# Patient Record
Sex: MALE | Race: ASIAN
[De-identification: names, ages, dates, MRNs, and addresses within clinical notes are randomized per-mention and may not be internally consistent; named-entity substitution may affect disease eponyms.]

---

## 2018-12-25 ENCOUNTER — HOSPITAL ENCOUNTER (INPATIENT)
Dept: HOSPITAL 26 - MED | Age: 83
LOS: 1 days | Discharge: LEFT BEFORE DISCHARGE | DRG: 871 | End: 2018-12-26
Attending: GENERAL PRACTICE | Admitting: GENERAL PRACTICE
Payer: COMMERCIAL

## 2018-12-25 VITALS — SYSTOLIC BLOOD PRESSURE: 128 MMHG | DIASTOLIC BLOOD PRESSURE: 74 MMHG

## 2018-12-25 VITALS — SYSTOLIC BLOOD PRESSURE: 114 MMHG | DIASTOLIC BLOOD PRESSURE: 72 MMHG

## 2018-12-25 VITALS — SYSTOLIC BLOOD PRESSURE: 117 MMHG | DIASTOLIC BLOOD PRESSURE: 77 MMHG

## 2018-12-25 VITALS — HEIGHT: 66 IN | BODY MASS INDEX: 25.71 KG/M2 | WEIGHT: 160 LBS

## 2018-12-25 VITALS — SYSTOLIC BLOOD PRESSURE: 103 MMHG | DIASTOLIC BLOOD PRESSURE: 62 MMHG

## 2018-12-25 VITALS — SYSTOLIC BLOOD PRESSURE: 110 MMHG | DIASTOLIC BLOOD PRESSURE: 64 MMHG

## 2018-12-25 DIAGNOSIS — Z53.21: ICD-10-CM

## 2018-12-25 DIAGNOSIS — I21.3: ICD-10-CM

## 2018-12-25 DIAGNOSIS — Z79.82: ICD-10-CM

## 2018-12-25 DIAGNOSIS — I10: ICD-10-CM

## 2018-12-25 DIAGNOSIS — E78.5: ICD-10-CM

## 2018-12-25 DIAGNOSIS — N40.0: ICD-10-CM

## 2018-12-25 DIAGNOSIS — A41.9: Primary | ICD-10-CM

## 2018-12-25 DIAGNOSIS — Z79.899: ICD-10-CM

## 2018-12-25 DIAGNOSIS — K58.9: ICD-10-CM

## 2018-12-25 DIAGNOSIS — J45.909: ICD-10-CM

## 2018-12-25 DIAGNOSIS — J69.0: ICD-10-CM

## 2018-12-25 DIAGNOSIS — E66.3: ICD-10-CM

## 2018-12-25 DIAGNOSIS — Z87.891: ICD-10-CM

## 2018-12-25 DIAGNOSIS — E87.6: ICD-10-CM

## 2018-12-25 DIAGNOSIS — I25.10: ICD-10-CM

## 2018-12-25 DIAGNOSIS — J44.9: ICD-10-CM

## 2018-12-25 LAB
ALBUMIN FLD-MCNC: 3.5 G/DL (ref 3.4–5)
ANION GAP SERPL CALCULATED.3IONS-SCNC: 13.7 MMOL/L (ref 8–16)
APPEARANCE UR: CLEAR
AST SERPL-CCNC: 23 U/L (ref 15–37)
BASOPHILS # BLD AUTO: 0 K/UL (ref 0–0.22)
BASOPHILS NFR BLD AUTO: 0.2 % (ref 0–2)
BILIRUB SERPL-MCNC: 0.8 MG/DL (ref 0–1)
BILIRUB UR QL STRIP: NEGATIVE
BUN SERPL-MCNC: 13 MG/DL (ref 7–18)
CHLORIDE SERPL-SCNC: 104 MMOL/L (ref 98–107)
CO2 SERPL-SCNC: 25.3 MMOL/L (ref 21–32)
COLOR UR: YELLOW
CREAT SERPL-MCNC: 0.7 MG/DL (ref 0.7–1.3)
DEPRECATED D DIMER PPP-ACNC: 585 NG/ML (ref 0–400)
EOSINOPHIL # BLD AUTO: 0.1 K/UL (ref 0–0.4)
EOSINOPHIL NFR BLD AUTO: 1.6 % (ref 0–4)
ERYTHROCYTE [DISTWIDTH] IN BLOOD BY AUTOMATED COUNT: 13.1 % (ref 11.6–13.7)
GFR SERPL CREATININE-BSD FRML MDRD: (no result) ML/MIN (ref 90–?)
GLUCOSE SERPL-MCNC: 122 MG/DL (ref 74–106)
GLUCOSE UR STRIP-MCNC: NEGATIVE MG/DL
HCT VFR BLD AUTO: 41.7 % (ref 36–52)
HGB BLD-MCNC: 13.8 G/DL (ref 12–18)
HGB UR QL STRIP: (no result)
LEUKOCYTE ESTERASE UR QL STRIP: NEGATIVE
LIPASE SERPL-CCNC: 112 U/L (ref 73–393)
LYMPHOCYTES # BLD AUTO: 2.1 K/UL (ref 2–11.5)
LYMPHOCYTES NFR BLD AUTO: 28.9 % (ref 20.5–51.1)
MCH RBC QN AUTO: 32 PG (ref 27–31)
MCHC RBC AUTO-ENTMCNC: 33 G/DL (ref 33–37)
MCV RBC AUTO: 96.9 FL (ref 80–94)
MONOCYTES # BLD AUTO: 0.7 K/UL (ref 0.8–1)
MONOCYTES NFR BLD AUTO: 9.1 % (ref 1.7–9.3)
NEUTROPHILS # BLD AUTO: 4.4 K/UL (ref 1.8–7.7)
NEUTROPHILS NFR BLD AUTO: 60.2 % (ref 42.2–75.2)
NITRITE UR QL STRIP: NEGATIVE
PH UR STRIP: 7 [PH] (ref 5–9)
PHOSPHATE SERPL-MCNC: 2.7 MG/DL (ref 2.5–4.9)
PLATELET # BLD AUTO: 161 K/UL (ref 140–450)
POTASSIUM SERPL-SCNC: 3 MMOL/L (ref 3.5–5.1)
PROTHROMBIN TIME: 9.5 SECS (ref 10.8–13.4)
RBC # BLD AUTO: 4.31 MIL/UL (ref 4.2–6.1)
RBC #/AREA URNS HPF: (no result) /HPF (ref 0–5)
SODIUM SERPL-SCNC: 140 MMOL/L (ref 136–145)
WBC # BLD AUTO: 7.4 K/UL (ref 4.8–10.8)
WBC,URINE: (no result) /HPF (ref 0–5)

## 2018-12-25 RX ADMIN — Medication SCH MG: at 11:13

## 2018-12-25 RX ADMIN — CLINDAMYCIN PHOSPHATE SCH MLS/HR: 600 INJECTION, SOLUTION INTRAVENOUS at 13:52

## 2018-12-25 RX ADMIN — HEPARIN SODIUM SCH MLS/HR: 5000 INJECTION, SOLUTION INTRAVENOUS at 16:27

## 2018-12-25 RX ADMIN — Medication SCH MG: at 10:22

## 2018-12-25 RX ADMIN — SODIUM CHLORIDE SCH MLS/HR: 9 INJECTION, SOLUTION INTRAVENOUS at 11:14

## 2018-12-25 RX ADMIN — CLINDAMYCIN PHOSPHATE SCH MLS/HR: 600 INJECTION, SOLUTION INTRAVENOUS at 21:04

## 2018-12-25 NOTE — NUR
83Y/M BROUGHT IN BY EMS FROM HOME, PT C/O MIDSTERNAL CHEST WALL PAIN UNPROVOKED 
NON RADIATING AS PER DAUGHTER, Kyrgyz MAINLY SPEAKING, NO PEDAL EDEMA, NO 
JVD NOTED, SKIN DRY WARM INTACT, BED DOWN, BEDRAIL UP X 1, ER MD AWARE AND 
NOTIFIED OF PT STATUS.



HX: HYPERLIPIDEMIA, HTN, COPD, BPH, IBS



RX: EXFORGE, FLOMAX, OYSCO, SINGULAIR, APRODINE, ASA, LINZESS, METOPROLOL, 
SIMVASTATIN

## 2018-12-25 NOTE — NUR
RECEIVED PT FROM ED VIA [facility]. PT IS AMBULATORY WITHOUT ASSIST, GAIT EVEN AND STEADY. AOX4. 
NO C/O PAIN OR DISCOMFORT AT THIS TIME. DENIES CHEST PAIN, N/V, LIGHTHEADEDNESS. NO 
DIAPHORESIS. VITALS STABLE. RESPIRATIONS EVEN AND UNLABORED ON RA. SKIN INTACT. PLACED ON 
TELE MONITOR. PT IS Maltese SPEAKING BUT PREFERS FAMILY MEMBERS AT BEDSIDE TO TRANSLATE. 
DISCUSSED POC WITH PATIENT AND FAMILY MEMBERS. ALL SAFETY PRECAUTIONS IN PLACE, WILL 
CONTINUE TO MONITOR. 

-------------------------------------------------------------------------------

Addendum: 12/25/18 at 1546 by Ayde Allen RN

-------------------------------------------------------------------------------

NITROBID PATCH ON UPPER CHEST LEFT STERNAL BORDER. 

-------------------------------------------------------------------------------

Addendum: 12/25/18 at 1910 by Ayde Allen RN

-------------------------------------------------------------------------------

NITRO-BID TOPICAL.

## 2018-12-25 NOTE — NUR
PER PHARMACIST, CLINDAMYCIN IS ABLE TO BE RUN IN SAME LINE WITH K RIDER. ROCEPHIN IS ABLE TO 
RUN IN SAME LINE WITH K RIDER AS WELL. PHARMACIST TO CHANGE ROCEPHIN SCHEDULE TO LATER TIME.

## 2018-12-25 NOTE — NUR
DR. GERBER HAS EXPLAINED TO PATIENT HOW TO OBTAIN URINE CULTURE. SAMPLE CUP PROVIDED TO PT. 
PT VERBALIZED COMPLETE UNDERSTANDING.

## 2018-12-25 NOTE — NUR
ASKED PHARMACY TO VERIFY HEPARIN DRIP. 

CALLED CT AND INFORMED THEM THAT PT IS READY FOR HEAD CT. THEY WILL COME  PATIENT 
NOW.

## 2018-12-25 NOTE — NUR
NOTIFIED DR. GERBER OF CRITICAL TROPONIN VALUE. PT DENIES CHEST PAIN/DISCOMFORT, N/V, SOB, 
DIZZINESS. NO DIAPHORESIS. PER DR. GERBER, START HEPARIN DRIP AFTER STAT CT HEAD RESULTS.

## 2018-12-25 NOTE — NUR
PT REFUSED ASPIRIN. STATES THAT HE TOOK ASPIRIN THIS MORNING. PER ED RN, PT ALSO RECEIVED 
ASPIRIN IN AMBULANCE THIS MORNING. 

-------------------------------------------------------------------------------

Addendum: 12/25/18 at 1609 by Ayde Allen RN

-------------------------------------------------------------------------------

UNABLE TO RETURN TO [facility], MEDICATION ALREADY REMOVED FROM PACKAGE. WILL DISPOSE PER 
PROTOCOL.

## 2018-12-25 NOTE — NUR
ADMINISTERED CLINDAMYCIN, RUNNING THROUGH SAME IV SITE WITH CURRY RIDER. K RIDER RUNNING AT 
30ML/HOUR DUE TO PT C/O PAIN AT IV SITE.

## 2018-12-25 NOTE — NUR
RECEIVED REPORT FROM DAY SHIFT RN FOR CONTINUITY OF CARE. PT IS A/OX4, ON ROOM AIR. PT IS 
ABLE TO MAKE NEEDS KNOWN, ABLE TO FOLLOW COMMANDS. PT AMBULATES WITH STEADY GAIT, SKIN IS 
INTACT. PT HAS A 20G IV TO LEFT HAND, ASYMPTOMATIC AND INTACT. DISCUSSED PLAN OF CARE WITH 
PT, PT VERBALIZED UNDERSTANDING. VITAL SIGNS WITHIN NORMAL LIMITS. PT STABLE, DENIES PAIN, 
NO SIGNS OF DISTRESS NOTED AT THIS TIME. PT POSITIONED FOR COMFORT. BED IN LOWEST POSITION, 
BED ALARM ON. CALL LIGHT WITHIN REACH, WILL CONTINUE TO MONITOR.

## 2018-12-25 NOTE — NUR
ADMINISTERED SCHEDULED MEDICATIONS, PT TOLERATED WELL. DENIES PAIN, NO SIGNS OF DISTRESS 
NOTED AT THIS TIME. PT POSITIONED FOR COMFORT. BED IN LOWEST POSITION, BED ALARM ON. CALL 
LIGHT WITHIN REACH, WILL CONTINUE TO MONITOR.

## 2018-12-25 NOTE — NUR
PER LAB, URINE MUST BE COLLECTED AGAIN FOR URINE CULTURE. BLOOD CULTURES NOT COLLECTED YET. 
WILL NOT START IV ABX UNTIL BLOOD CULTURES OBTAINED.

## 2018-12-25 NOTE — NUR
Patient will be admitted to care of . Admited to tele floor.  Will go 
to room 107-b. Belongings list completed.  Report to ariana hammond.

## 2018-12-25 NOTE — NUR
RECEIVED REPORT FROM DAY SHIFT RN FOR CONTINUITY OF CARE. PT IS A/OX4, ON ROOM AIR. PT IS 
ABLE TO MAKE NEEDS KNOWN, ABLE TO FOLLOW COMMANDS. PT AMBULATES WITH STEADY GAIT, SKIN IS 
INTACT. PT HAS A 22G IV TO LEFT AC, ASYMPTOMATIC AND INTACT. DISCUSSED PLAN OF CARE WITH PT, 
PT VERBALIZED UNDERSTANDING. VITAL SIGNS WITHIN NORMAL LIMITS. PT STABLE, DENIES PAIN, NO 
SIGNS OF DISTRESS NOTED AT THIS TIME. PT POSITIONED FOR COMFORT. BED IN LOWEST POSITION, BED 
ALARM ON. CALL LIGHT WITHIN REACH, WILL CONTINUE TO MONITOR. 

-------------------------------------------------------------------------------

Addendum: 12/26/18 at 0341 by Alisha Cloud RN

-------------------------------------------------------------------------------

DISREGARD, WRONG IV SITE.

## 2018-12-26 VITALS — SYSTOLIC BLOOD PRESSURE: 130 MMHG | DIASTOLIC BLOOD PRESSURE: 76 MMHG

## 2018-12-26 VITALS — DIASTOLIC BLOOD PRESSURE: 79 MMHG | SYSTOLIC BLOOD PRESSURE: 137 MMHG

## 2018-12-26 VITALS — SYSTOLIC BLOOD PRESSURE: 130 MMHG | DIASTOLIC BLOOD PRESSURE: 74 MMHG

## 2018-12-26 VITALS — SYSTOLIC BLOOD PRESSURE: 141 MMHG | DIASTOLIC BLOOD PRESSURE: 76 MMHG

## 2018-12-26 LAB
ANION GAP SERPL CALCULATED.3IONS-SCNC: 10.5 MMOL/L (ref 8–16)
BASOPHILS # BLD AUTO: 0 K/UL (ref 0–0.22)
BASOPHILS NFR BLD AUTO: 0.2 % (ref 0–2)
BUN SERPL-MCNC: 9 MG/DL (ref 7–18)
CHLORIDE SERPL-SCNC: 107 MMOL/L (ref 98–107)
CO2 SERPL-SCNC: 25.5 MMOL/L (ref 21–32)
CREAT SERPL-MCNC: 0.7 MG/DL (ref 0.7–1.3)
EOSINOPHIL # BLD AUTO: 0.2 K/UL (ref 0–0.4)
EOSINOPHIL NFR BLD AUTO: 1.9 % (ref 0–4)
ERYTHROCYTE [DISTWIDTH] IN BLOOD BY AUTOMATED COUNT: 13 % (ref 11.6–13.7)
GFR SERPL CREATININE-BSD FRML MDRD: (no result) ML/MIN (ref 90–?)
GLUCOSE SERPL-MCNC: 101 MG/DL (ref 74–106)
HCT VFR BLD AUTO: 39.3 % (ref 36–52)
HGB BLD-MCNC: 13 G/DL (ref 12–18)
LYMPHOCYTES # BLD AUTO: 2.5 K/UL (ref 2–11.5)
LYMPHOCYTES NFR BLD AUTO: 30.1 % (ref 20.5–51.1)
MCH RBC QN AUTO: 32 PG (ref 27–31)
MCHC RBC AUTO-ENTMCNC: 33 G/DL (ref 33–37)
MCV RBC AUTO: 97.1 FL (ref 80–94)
MONOCYTES # BLD AUTO: 0.6 K/UL (ref 0.8–1)
MONOCYTES NFR BLD AUTO: 7 % (ref 1.7–9.3)
NEUTROPHILS # BLD AUTO: 5.1 K/UL (ref 1.8–7.7)
NEUTROPHILS NFR BLD AUTO: 60.8 % (ref 42.2–75.2)
PLATELET # BLD AUTO: 169 K/UL (ref 140–450)
POTASSIUM SERPL-SCNC: 3 MMOL/L (ref 3.5–5.1)
RBC # BLD AUTO: 4.05 MIL/UL (ref 4.2–6.1)
SODIUM SERPL-SCNC: 140 MMOL/L (ref 136–145)
WBC # BLD AUTO: 8.4 K/UL (ref 4.8–10.8)

## 2018-12-26 RX ADMIN — CLINDAMYCIN PHOSPHATE SCH MLS/HR: 600 INJECTION, SOLUTION INTRAVENOUS at 05:03

## 2018-12-26 RX ADMIN — HEPARIN SODIUM SCH MLS/HR: 5000 INJECTION, SOLUTION INTRAVENOUS at 10:33

## 2018-12-26 RX ADMIN — SODIUM CHLORIDE SCH MLS/HR: 9 INJECTION, SOLUTION INTRAVENOUS at 05:03

## 2018-12-26 RX ADMIN — HEPARIN SODIUM SCH MLS/HR: 5000 INJECTION, SOLUTION INTRAVENOUS at 09:59

## 2018-12-26 NOTE — NUR
NO SIGNS OF DISTRESS NOTED AT THIS TIME. PT POSITIONED FOR COMFORT. BED IN LOWEST POSITION, 
BED ALARM ON. CALL LIGHT WITHIN REACH, WILL CONTINUE TO MONITOR.

## 2018-12-26 NOTE — NUR
12/26/18 RD INITIAL ASSESSMENT COMPLETED



PLEASE REFER TO NUTRITION ASSESSMENT UNDER CARE ACTIVITY FOR ESTIMATED NUTRITIONAL NEEDS. 



1. CONTINUE CARDIAC DIET AS TOLERATED 

2. RD TO PROVIDE NUTRITION EDUCATION ON FOLLOW UP VISIT

3. RD TO FOLLOW-UP 3-5 DAYS, MODERATE RISK 



FANI UMAÑA, RD

## 2018-12-26 NOTE — NUR
PATIENT HAS BEEN SCREENED AND CATEGORIZED AS HIGH NUTRITION RISK. PATIENT WILL BE SEEN 
WITHIN 1-2 DAYS OF ADMISSION.



12/26/18



FANI UMAÑA RD

## 2018-12-26 NOTE — NUR
SPOKE WITH DR. GERBER.   HE SAID THEY ARE CANCELLING THE TRANSFER TO [facility].   I CALLED [facility] 
AND SPOKE WITH PAULA IN ADMITTING AND TOLD HER TO CANCEL THE TRANSFER.

## 2018-12-26 NOTE — NUR
FAXED TRANSFER BACK AGREEMENT TO BED CONTROL AT [facility].   [phone].

SPOKE WITH APRIL IN ADMITTING.   STILL WAITING FOR ADMITTING PHYSICIAN TO CALL THEM.  I 
INFORMED DR. BATISTA.

## 2018-12-26 NOTE — NUR
ADMINISTERED A BOLUS OF HEPARIN 4400 UNITS AND INCREASED RATE TO 1200 UNITS/HR PER HEPARIN 
PROTOCOL FOR PTT OF 34.5. PT TOLERATED WELL. NO S/S OF BLEEDING NOTED. INSTRUCTED PT TO 
REPORT ANY SIGNS OF BLEEDING. EXPLAINED MEDICATIONS VIA PHONE  #[phone].

## 2018-12-26 NOTE — NUR
VITAL SIGNS WITHIN NORMAL LIMITS. PT STABLE, DENIES PAIN, NO SIGNS OF DISTRESS NOTED AT THIS 
TIME. PT POSITIONED FOR COMFORT. BED IN LOWEST POSITION, BED ALARM ON. CALL LIGHT WITHIN 
REACH, WILL CONTINUE TO MONITOR.

## 2018-12-26 NOTE — NUR
DR. GERBER AT BEDSIDE SPEAKING WITH DAUGHTER REGARDING POC. PT AND DAUGHTER REQUEST TO LEAVE 
AMA. AMA FORM SIGNED BY PATIENT.

## 2018-12-26 NOTE — NUR
RECEIVED ORDER FOR TRANSFER TO HIGHER LEVEL OF CARE.   CALLED [facility] AND SPOKE WITH CASEY 
IN ADMITTING.   FAXED THE FACE SHEET TO HER -[phone].

## 2018-12-26 NOTE — NUR
IV'S DC'D, CATH TIPS INTACT, BLEEDING CONTROLLED, PT REDD WELL, PT UP OUT OF BED WITH MINIMAL 
ASSIST, TRANSFERRED TO WHEELCHAIR WITH MINIMAL ASSIST, LEFT AMA WITH DAUGHTER AT THIS TIME.  
PT ESCORTED OUT IN WHEELCHAIR.

## 2018-12-26 NOTE — NUR
RECEIVED BEDSIDE REPORT FROM NIGHT SHIFT NURSE. PT AWAKE AND STABLE. REVIEWED PLAN OF CARE 
WITH PATIENT. NO SIGNS OF DISTRESS NOTED. IV LINE WNL. CURRENTLY RECEIVING HEPARIN 900 
UNITS/HR. NO SIGNS OF BLEEDING NOTED. PT DENIED CHEST PAIN OR ANY DISCOMFORT. SAFETY 
MEASURES IN PLACE. BED IN LOW POSITION, SIDE RAILS UP. CALL BELL WITHIN REACH. WILL CONTINUE 
TO MONITOR.

## 2019-03-23 ENCOUNTER — HOSPITAL ENCOUNTER (INPATIENT)
Dept: HOSPITAL 26 - MED | Age: 84
LOS: 3 days | Discharge: HOME | DRG: 281 | End: 2019-03-26
Attending: GENERAL PRACTICE | Admitting: GENERAL PRACTICE
Payer: COMMERCIAL

## 2019-03-23 VITALS — BODY MASS INDEX: 29.77 KG/M2 | WEIGHT: 168 LBS | HEIGHT: 63 IN

## 2019-03-23 VITALS — SYSTOLIC BLOOD PRESSURE: 184 MMHG | DIASTOLIC BLOOD PRESSURE: 85 MMHG

## 2019-03-23 DIAGNOSIS — N40.0: ICD-10-CM

## 2019-03-23 DIAGNOSIS — I10: ICD-10-CM

## 2019-03-23 DIAGNOSIS — T46.1X5A: ICD-10-CM

## 2019-03-23 DIAGNOSIS — Z71.3: ICD-10-CM

## 2019-03-23 DIAGNOSIS — I25.10: ICD-10-CM

## 2019-03-23 DIAGNOSIS — E44.1: ICD-10-CM

## 2019-03-23 DIAGNOSIS — E66.3: ICD-10-CM
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
## 2019-03-23 DIAGNOSIS — I21.A1: Primary | ICD-10-CM

## 2019-03-23 DIAGNOSIS — I16.0: ICD-10-CM

## 2019-03-23 DIAGNOSIS — I44.7: ICD-10-CM

## 2019-03-23 DIAGNOSIS — E87.6: ICD-10-CM

## 2019-03-23 DIAGNOSIS — I25.2: ICD-10-CM

## 2019-03-23 DIAGNOSIS — J45.909: ICD-10-CM

## 2019-03-23 DIAGNOSIS — Z79.899: ICD-10-CM

## 2019-03-23 DIAGNOSIS — E78.5: ICD-10-CM

## 2019-03-23 LAB
ALBUMIN FLD-MCNC: 3.3 G/DL (ref 3.4–5)
ANION GAP SERPL CALCULATED.3IONS-SCNC: 11.3 MMOL/L (ref 8–16)
AST SERPL-CCNC: 16 U/L (ref 15–37)
BASOPHILS # BLD AUTO: 0 K/UL (ref 0–0.22)
BASOPHILS NFR BLD AUTO: 0.3 % (ref 0–2)
BILIRUB SERPL-MCNC: 0.3 MG/DL (ref 0–1)
BUN SERPL-MCNC: 17 MG/DL (ref 7–18)
CHLORIDE SERPL-SCNC: 104 MMOL/L (ref 98–107)
CK MB SERPL-MCNC: 1.7 NG/ML (ref 0–3.6)
CO2 SERPL-SCNC: 28.8 MMOL/L (ref 21–32)
CREAT SERPL-MCNC: 0.9 MG/DL (ref 0.7–1.3)
EOSINOPHIL # BLD AUTO: 0.2 K/UL (ref 0–0.4)
EOSINOPHIL NFR BLD AUTO: 2.4 % (ref 0–4)
ERYTHROCYTE [DISTWIDTH] IN BLOOD BY AUTOMATED COUNT: 12.9 % (ref 11.6–13.7)
GFR SERPL CREATININE-BSD FRML MDRD: (no result) ML/MIN (ref 90–?)
GLUCOSE SERPL-MCNC: 122 MG/DL (ref 74–106)
HCT VFR BLD AUTO: 37.8 % (ref 36–52)
HGB BLD-MCNC: 12.6 G/DL (ref 12–18)
LYMPHOCYTES # BLD AUTO: 2.3 K/UL (ref 2–11.5)
LYMPHOCYTES NFR BLD AUTO: 33.5 % (ref 20.5–51.1)
MCH RBC QN AUTO: 32 PG (ref 27–31)
MCHC RBC AUTO-ENTMCNC: 33 G/DL (ref 33–37)
MCV RBC AUTO: 94.8 FL (ref 80–94)
MONOCYTES # BLD AUTO: 0.4 K/UL (ref 0.8–1)
MONOCYTES NFR BLD AUTO: 6.6 % (ref 1.7–9.3)
NEUTROPHILS # BLD AUTO: 3.9 K/UL (ref 1.8–7.7)
NEUTROPHILS NFR BLD AUTO: 57.2 % (ref 42.2–75.2)
PLATELET # BLD AUTO: 136 K/UL (ref 140–450)
POTASSIUM SERPL-SCNC: 3.1 MMOL/L (ref 3.5–5.1)
RBC # BLD AUTO: 3.99 MIL/UL (ref 4.2–6.1)
SODIUM SERPL-SCNC: 141 MMOL/L (ref 136–145)
WBC # BLD AUTO: 6.8 K/UL (ref 4.8–10.8)

## 2019-03-23 NOTE — NUR
84 y/o m bib family. AAOx4. presented to ed with high blood pressure x2 days. 
per pt family member takes Enapril daily but today took 2 doses d/t high blood 
pressure c/o headache x 2 days. no vision changes. /83 at evaluation. 
Bedrails x1 up. HOB elvated. MD notified. Will continue to monitor. 



PHM: HTN and hyperlipidema

NKA

## 2019-03-24 VITALS — SYSTOLIC BLOOD PRESSURE: 164 MMHG | DIASTOLIC BLOOD PRESSURE: 92 MMHG

## 2019-03-24 VITALS — SYSTOLIC BLOOD PRESSURE: 158 MMHG | DIASTOLIC BLOOD PRESSURE: 77 MMHG

## 2019-03-24 VITALS — DIASTOLIC BLOOD PRESSURE: 69 MMHG | SYSTOLIC BLOOD PRESSURE: 149 MMHG

## 2019-03-24 VITALS — SYSTOLIC BLOOD PRESSURE: 177 MMHG | DIASTOLIC BLOOD PRESSURE: 83 MMHG

## 2019-03-24 VITALS — DIASTOLIC BLOOD PRESSURE: 65 MMHG | SYSTOLIC BLOOD PRESSURE: 143 MMHG

## 2019-03-24 VITALS — SYSTOLIC BLOOD PRESSURE: 149 MMHG | DIASTOLIC BLOOD PRESSURE: 74 MMHG

## 2019-03-24 LAB
AMYLASE SERPL-CCNC: 68 U/L (ref 25–115)
ANION GAP SERPL CALCULATED.3IONS-SCNC: 13.5 MMOL/L (ref 8–16)
BASOPHILS # BLD AUTO: 0 K/UL (ref 0–0.22)
BASOPHILS NFR BLD AUTO: 0.1 % (ref 0–2)
BUN SERPL-MCNC: 14 MG/DL (ref 7–18)
CHLORIDE SERPL-SCNC: 105 MMOL/L (ref 98–107)
CHOLEST/HDLC SERPL: 3.1 {RATIO} (ref 1–4.5)
CO2 SERPL-SCNC: 28.3 MMOL/L (ref 21–32)
CREAT SERPL-MCNC: 0.8 MG/DL (ref 0.7–1.3)
EOSINOPHIL # BLD AUTO: 0.1 K/UL (ref 0–0.4)
EOSINOPHIL NFR BLD AUTO: 1.6 % (ref 0–4)
ERYTHROCYTE [DISTWIDTH] IN BLOOD BY AUTOMATED COUNT: 13 % (ref 11.6–13.7)
GFR SERPL CREATININE-BSD FRML MDRD: (no result) ML/MIN (ref 90–?)
GLUCOSE SERPL-MCNC: 107 MG/DL (ref 74–106)
HCT VFR BLD AUTO: 40.5 % (ref 36–52)
HDLC SERPL-MCNC: 35 MG/DL (ref 40–60)
HGB BLD-MCNC: 13.7 G/DL (ref 12–18)
LDLC SERPL CALC-MCNC: 56 MG/DL (ref 60–100)
LIPASE SERPL-CCNC: 130 U/L (ref 73–393)
LYMPHOCYTES # BLD AUTO: 2.9 K/UL (ref 2–11.5)
LYMPHOCYTES NFR BLD AUTO: 33.9 % (ref 20.5–51.1)
MAGNESIUM SERPL-MCNC: 2.1 MG/DL (ref 1.8–2.4)
MCH RBC QN AUTO: 32 PG (ref 27–31)
MCHC RBC AUTO-ENTMCNC: 34 G/DL (ref 33–37)
MCV RBC AUTO: 94.5 FL (ref 80–94)
MONOCYTES # BLD AUTO: 0.5 K/UL (ref 0.8–1)
MONOCYTES NFR BLD AUTO: 6.2 % (ref 1.7–9.3)
NEUTROPHILS # BLD AUTO: 5 K/UL (ref 1.8–7.7)
NEUTROPHILS NFR BLD AUTO: 58.2 % (ref 42.2–75.2)
PHOSPHATE SERPL-MCNC: 3.2 MG/DL (ref 2.5–4.9)
PLATELET # BLD AUTO: 148 K/UL (ref 140–450)
POTASSIUM SERPL-SCNC: 3.8 MMOL/L (ref 3.5–5.1)
PROTHROMBIN TIME: 9.4 SECS (ref 10.8–13.4)
RBC # BLD AUTO: 4.28 MIL/UL (ref 4.2–6.1)
SODIUM SERPL-SCNC: 143 MMOL/L (ref 136–145)
TRIGL SERPL-MCNC: 89 MG/DL (ref 30–150)
TSH SERPL DL<=0.05 MIU/L-ACNC: 1.91 UIU/ML (ref 0.34–3.74)
WBC # BLD AUTO: 8.6 K/UL (ref 4.8–10.8)

## 2019-03-24 RX ADMIN — TAMSULOSIN HYDROCHLORIDE SCH MG: 0.4 CAPSULE ORAL at 20:22

## 2019-03-24 RX ADMIN — METOPROLOL SUCCINATE SCH MG: 50 TABLET, EXTENDED RELEASE ORAL at 19:46

## 2019-03-24 RX ADMIN — ATORVASTATIN CALCIUM SCH MG: 20 TABLET, FILM COATED ORAL at 20:22

## 2019-03-24 RX ADMIN — MONTELUKAST SCH MG: 10 TABLET, FILM COATED ORAL at 20:22

## 2019-03-24 RX ADMIN — TAMSULOSIN HYDROCHLORIDE SCH MG: 0.4 CAPSULE ORAL at 08:43

## 2019-03-24 RX ADMIN — Medication SCH MG: at 08:42

## 2019-03-24 NOTE — NUR
PT LYING IN BED IN STABLE CONDITION WITH FAMILY AT BEDSIDE. BED IN LOW POSITION. CALL LIGHT 
AT BEDSIDE. WILL CONTINUE TO MONITOR.

## 2019-03-24 NOTE — NUR
PER DR BARRETT, SHE WILL CANCEL EKG ORDERED FOR 0022 SINCE PT HAD ONE IN ER. NEXT EKG 
ORDERED FOR 1100 TODAY.

## 2019-03-24 NOTE — NUR
PER DR. BARRETT TO GIVE PT ENALAPRIL 10MG NOW,  BP AT THIS MOMENT 164/70 HR 60, PT RESTING, 
NO DISTRESS NOTED, MEDICATION ADMINISTERED, PT TOLERATED WELL, NO DISTRESS NOTED, CALL LIGHT 
WITHIN REACH, WILL CONTINUE TO MONITOR.

## 2019-03-24 NOTE — NUR
PATIENT ON ROOM AIR, PULSE OX SAT 94%. FAMILY AT BEDSIDE. PATIENT DENIES SOB/RESPIRATORY 
DIFFICULTY. BREATH SOUNDS CLEAR. PRN TX NOT GIVEN; TREATMENT NOT INDICATED AT THIS TIME. NO 
RESPIRATORY DISTRESS NOTED AT THIS TIME. WILL CONTINUE TO MONITOR.

## 2019-03-24 NOTE — NUR
PT ARRIVED AT UNIT VIA GURNEY PT AMBULATED TO BED, TOLERATED WELL, RECEIVED BEDSIDE REPORT 
FROM ER NURSE, PT STABLE, NO DISTRESS NOTED, IV TO R HAND 22G PATENT, INTACT, SL, PT ON ROOM 
AIR, NO SOB, MRSA SWAB TAKEN, V/S TAKEN, PT BP ELEVATED WILL NOTIFY  PT RESTING, NO 
DISTRESS NOTED, CALL LIGHT WITHIN REACH, INITIAL ASSESSMENT DONE, ALL SAFETY PRECAUTION MET, 
WILL CONTINUE TO MONITOR.

## 2019-03-24 NOTE — NUR
ENDORSED PT TO DAY SHIFT NURSE CALIN LINTON, PT STABLE, NO DISTRESS NOTED, CALL LIGHT WITHIN 
REACH.

## 2019-03-24 NOTE — NUR
CRITICAL RESULT: PTT 93.7 HELD HEPARIN DRIP 1 HOUR. PT IN STABLE CONDITION. WILL CONTINUE TO 
MONITOR.

## 2019-03-24 NOTE — NUR
HEPARIN DRIP STARTED, PT TOLERATED WELL, PTT ORDERED FOR 6 HRS FROM NOW 0823,  NEW IV 
INSERTED TO R FA 22G PATENT, INTACT, RUNNING NS @ 60ML/HR, CALL LIGHT WITHIN REACH, WILL 
CONTINUE TO MONITOR.

## 2019-03-24 NOTE — NUR
PT IN STABLE CONDITION FAMILY AT BEDSIDE. BED IN LOW POSITION. CALL LIGHT AT BEDSIDE. WILL 
CONTINUE TO MONITOR.

## 2019-03-24 NOTE — NUR
Patient will be admitted to care of MD Anirudh. Admited to Tele.  Will go to room 
111A. Belongings list completed.  Report to SHAILA Sterling.

## 2019-03-24 NOTE — NUR
RECEIVED ENDORSEMENT FROM AM SHIFT RN; PATIENT A/Ox4, Bangladeshi SPEAKING. CAN COMMUNICATE 
THROUGH TRANSLATION VIA DAUGHTER AT BEDSIDE, ABLE TO MAKE NEEDS KNOWN. ORIENTED BOTH TO ROOM 
AND UPDATED BOARD. NO SOB OR DISTRESS NOTED. IV SITES ON RIGHT HAND, 22 GAUGE, SALINE LOCKED 
AND RIGHT FOREARM, 22 GAUGE RUNNING IVF AT 60mL/HR. SKIN INTACT. BED IN THE LOWEST POSITION, 
CALL LIGHT WITHIN REACH. INITIAL ASSESSMENT DONE. WILL CONTINUE TO MONITOR.

## 2019-03-25 VITALS — DIASTOLIC BLOOD PRESSURE: 67 MMHG | SYSTOLIC BLOOD PRESSURE: 152 MMHG

## 2019-03-25 VITALS — DIASTOLIC BLOOD PRESSURE: 57 MMHG | SYSTOLIC BLOOD PRESSURE: 135 MMHG

## 2019-03-25 VITALS — DIASTOLIC BLOOD PRESSURE: 57 MMHG | SYSTOLIC BLOOD PRESSURE: 124 MMHG

## 2019-03-25 VITALS — SYSTOLIC BLOOD PRESSURE: 159 MMHG | DIASTOLIC BLOOD PRESSURE: 72 MMHG

## 2019-03-25 VITALS — DIASTOLIC BLOOD PRESSURE: 57 MMHG | SYSTOLIC BLOOD PRESSURE: 125 MMHG

## 2019-03-25 VITALS — SYSTOLIC BLOOD PRESSURE: 130 MMHG | DIASTOLIC BLOOD PRESSURE: 70 MMHG

## 2019-03-25 LAB
ANION GAP SERPL CALCULATED.3IONS-SCNC: 16.1 MMOL/L (ref 8–16)
APPEARANCE UR: CLEAR
BARBITURATES UR QL SCN: (no result) NG/ML
BASOPHILS # BLD AUTO: 0 K/UL (ref 0–0.22)
BASOPHILS NFR BLD AUTO: 0.2 % (ref 0–2)
BENZODIAZ UR QL SCN: (no result) NG/ML
BILIRUB UR QL STRIP: NEGATIVE
BUN SERPL-MCNC: 12 MG/DL (ref 7–18)
BZE UR QL SCN: (no result) NG/ML
CANNABINOIDS UR QL SCN: (no result) NG/ML
CHLORIDE SERPL-SCNC: 106 MMOL/L (ref 98–107)
CO2 SERPL-SCNC: 23.7 MMOL/L (ref 21–32)
COLOR UR: YELLOW
CREAT SERPL-MCNC: 0.8 MG/DL (ref 0.7–1.3)
EOSINOPHIL # BLD AUTO: 0.2 K/UL (ref 0–0.4)
EOSINOPHIL NFR BLD AUTO: 2.5 % (ref 0–4)
ERYTHROCYTE [DISTWIDTH] IN BLOOD BY AUTOMATED COUNT: 13.1 % (ref 11.6–13.7)
GFR SERPL CREATININE-BSD FRML MDRD: (no result) ML/MIN (ref 90–?)
GLUCOSE SERPL-MCNC: 98 MG/DL (ref 74–106)
GLUCOSE UR STRIP-MCNC: NEGATIVE MG/DL
HCT VFR BLD AUTO: 41.8 % (ref 36–52)
HGB BLD-MCNC: 14 G/DL (ref 12–18)
HGB UR QL STRIP: (no result)
LEUKOCYTE ESTERASE UR QL STRIP: NEGATIVE
LYMPHOCYTES # BLD AUTO: 2.5 K/UL (ref 2–11.5)
LYMPHOCYTES NFR BLD AUTO: 32.4 % (ref 20.5–51.1)
MAGNESIUM SERPL-MCNC: 2.1 MG/DL (ref 1.8–2.4)
MCH RBC QN AUTO: 32 PG (ref 27–31)
MCHC RBC AUTO-ENTMCNC: 33 G/DL (ref 33–37)
MCV RBC AUTO: 95 FL (ref 80–94)
MONOCYTES # BLD AUTO: 0.5 K/UL (ref 0.8–1)
MONOCYTES NFR BLD AUTO: 5.9 % (ref 1.7–9.3)
NEUTROPHILS # BLD AUTO: 4.6 K/UL (ref 1.8–7.7)
NEUTROPHILS NFR BLD AUTO: 59 % (ref 42.2–75.2)
NITRITE UR QL STRIP: NEGATIVE
OPIATES UR QL SCN: (no result) NG/ML
PCP UR QL SCN: (no result) NG/ML
PH UR STRIP: 7 [PH] (ref 5–9)
PHOSPHATE SERPL-MCNC: 2.9 MG/DL (ref 2.5–4.9)
PLATELET # BLD AUTO: 152 K/UL (ref 140–450)
POTASSIUM SERPL-SCNC: 3.8 MMOL/L (ref 3.5–5.1)
RBC # BLD AUTO: 4.4 MIL/UL (ref 4.2–6.1)
RBC #/AREA URNS HPF: (no result) /HPF (ref 0–5)
SODIUM SERPL-SCNC: 142 MMOL/L (ref 136–145)
T4 SERPL-MCNC: 5.8 UG/DL (ref 4.5–12)
WBC # BLD AUTO: 7.7 K/UL (ref 4.8–10.8)
WBC,URINE: (no result) /HPF (ref 0–5)

## 2019-03-25 RX ADMIN — TAMSULOSIN HYDROCHLORIDE SCH MG: 0.4 CAPSULE ORAL at 20:46

## 2019-03-25 RX ADMIN — METOPROLOL SUCCINATE SCH MG: 50 TABLET, EXTENDED RELEASE ORAL at 20:38

## 2019-03-25 RX ADMIN — Medication SCH MG: at 09:22

## 2019-03-25 RX ADMIN — TAMSULOSIN HYDROCHLORIDE SCH MG: 0.4 CAPSULE ORAL at 09:22

## 2019-03-25 RX ADMIN — ATORVASTATIN CALCIUM SCH MG: 20 TABLET, FILM COATED ORAL at 20:38

## 2019-03-25 RX ADMIN — ENALAPRIL MALEATE SCH MG: 10 TABLET ORAL at 09:23

## 2019-03-25 RX ADMIN — MONTELUKAST SCH MG: 10 TABLET, FILM COATED ORAL at 20:37

## 2019-03-25 NOTE — NUR
PT SITTING IN CHAIR RESTING WITH DAUGHTER AT BEDSIDE. ALL NEEDS MET AT THIS TIME. WILL 
CONTINUE TO MONITOR PT FOR CHANGES IN CONDITION.

## 2019-03-25 NOTE — NUR
PT RESTING IN BED. DAUGHTER AT BEDSIDE. PT BP /67. DR NOTIFIED OF FINDINGS. NO 
INTERVENTIONS ORDERED AT THIS TIME. PT DENIES PAIN, SOB, CHEST PAIN AT THIS TIME. WILL 
CONTINUE TO MONITOR PT FREQUENTLY. BED IN LOW POSITION, CALL LIGHT WITHIN REACH.

## 2019-03-25 NOTE — NUR
ADMINISTERED MORNING MEDS TO PT. PT TOLERATED WELL. NO COMPLAINTS OF PAIN OR SOB AT THIS 
TIME. WILL CONTINUE TO ROUND FREQUENTLY ON PT. BED IN LOW POSITION, CALL LIGHT WITHIN REACH.

## 2019-03-25 NOTE — NUR
RECEIVED FROM AM RN IN A CHAIR SITTING WITH GOOD AFFECT. DAUGHTER AT BEDSIDE. NO COMPLAINTS 
OF ANY PAIN AT THIS  TIME.  TELEMETRY MONITORING. HEPLOCKED. DENIES ANY PAIN. CALL LIGHT 
WITH IN REACH AND ENCOURAGED TO CALL FOR ANY HELP HE MAY NEED. PT'S DAUGHTER SPEAKS GOOD 
ENGLISH AND TALKS TO FATHER  IN THEIR DIALECT. PT. NOTED NODS HEAD AND ABLE TO SAY YES WHILE 
ME AND DAUGHTER WERE TALKING. TELEMETRY MONITORING.

## 2019-03-25 NOTE — NUR
PT EATING LUNCH AT SIDE OF BED WITH DAUGHTER AT BEDSIDE. PT TOLERATING MEAL WELL. NO 
COMPLAINTS OF PAIN OR DISTRESS AT THIS TIME. ALL NEEDS CURRENTLY MET. WILL CONTINUE TO ROUND 
FREQUENTLY ON PT. CALL LIGHT WITHIN REACH. BED IN LOW POSITION

## 2019-03-25 NOTE — NUR
DAUGHTER LEFT FOR HOME AND STATED SHE WILL BE BACK IN AM.  NO COMPLAINTS DONE. CALL LIGHT 
WITH IN REACH. AMBULATING WELL.

## 2019-03-25 NOTE — NUR
RECEIVED ENDORSEMENT FROM NIGHT SHIFT RN. PATIENT AAOx4, Bulgarian SPEAKING. CAN 
COMMUNICATE THROUGH TRANSLATION VIA DAUGHTER AT BEDSIDE. PT ABLE TO MAKE NEEDS KNOWN. 
ORIENTED BOTH TO ROOM AND UPDATED BOARD. NO SOB OR DISTRESS NOTED. IV SITES ON RIGHT HAND, 
22 GAUGE AND RIGHT FOREARM 22 GAUGE, BOTH SALINE LOCKED PER PT REQUEST. HE IS REFUSING 
FLUIDS DUE TO PT EXPECTING EARLY DC TODAY. SKIN INTACT. BED IN THE LOWEST POSITION, CALL 
LIGHT WITHIN REACH. WILL CONTINUE TO MONITOR.

## 2019-03-25 NOTE — NUR
DR. SHEPPARD ASSESSED PATIENT. TOLD PATIENT AND DAUGHTER THAT HE MAY GO HOME TODAY AFTER 
DOCTORS MAKE THEIR ROUNDS.

## 2019-03-26 VITALS — DIASTOLIC BLOOD PRESSURE: 64 MMHG | SYSTOLIC BLOOD PRESSURE: 128 MMHG

## 2019-03-26 VITALS — SYSTOLIC BLOOD PRESSURE: 131 MMHG | DIASTOLIC BLOOD PRESSURE: 62 MMHG

## 2019-03-26 LAB
ANION GAP SERPL CALCULATED.3IONS-SCNC: 12.8 MMOL/L (ref 8–16)
BASOPHILS # BLD AUTO: 0 K/UL (ref 0–0.22)
BASOPHILS NFR BLD AUTO: 0.2 % (ref 0–2)
BUN SERPL-MCNC: 16 MG/DL (ref 7–18)
CHLORIDE SERPL-SCNC: 104 MMOL/L (ref 98–107)
CO2 SERPL-SCNC: 28 MMOL/L (ref 21–32)
CREAT SERPL-MCNC: 0.9 MG/DL (ref 0.7–1.3)
EOSINOPHIL # BLD AUTO: 0.2 K/UL (ref 0–0.4)
EOSINOPHIL NFR BLD AUTO: 2.3 % (ref 0–4)
ERYTHROCYTE [DISTWIDTH] IN BLOOD BY AUTOMATED COUNT: 13.1 % (ref 11.6–13.7)
GFR SERPL CREATININE-BSD FRML MDRD: (no result) ML/MIN (ref 90–?)
GLUCOSE SERPL-MCNC: 125 MG/DL (ref 74–106)
HCT VFR BLD AUTO: 41.5 % (ref 36–52)
HGB BLD-MCNC: 14 G/DL (ref 12–18)
LYMPHOCYTES # BLD AUTO: 2.1 K/UL (ref 2–11.5)
LYMPHOCYTES NFR BLD AUTO: 28.1 % (ref 20.5–51.1)
MAGNESIUM SERPL-MCNC: 2.1 MG/DL (ref 1.8–2.4)
MCH RBC QN AUTO: 32 PG (ref 27–31)
MCHC RBC AUTO-ENTMCNC: 34 G/DL (ref 33–37)
MCV RBC AUTO: 95.3 FL (ref 80–94)
MONOCYTES # BLD AUTO: 0.5 K/UL (ref 0.8–1)
MONOCYTES NFR BLD AUTO: 6.4 % (ref 1.7–9.3)
NEUTROPHILS # BLD AUTO: 4.6 K/UL (ref 1.8–7.7)
NEUTROPHILS NFR BLD AUTO: 63 % (ref 42.2–75.2)
PHOSPHATE SERPL-MCNC: 3.5 MG/DL (ref 2.5–4.9)
PLATELET # BLD AUTO: 152 K/UL (ref 140–450)
POTASSIUM SERPL-SCNC: 3.8 MMOL/L (ref 3.5–5.1)
RBC # BLD AUTO: 4.36 MIL/UL (ref 4.2–6.1)
SODIUM SERPL-SCNC: 141 MMOL/L (ref 136–145)
WBC # BLD AUTO: 7.4 K/UL (ref 4.8–10.8)

## 2019-03-26 RX ADMIN — TAMSULOSIN HYDROCHLORIDE SCH MG: 0.4 CAPSULE ORAL at 08:49

## 2019-03-26 RX ADMIN — ENALAPRIL MALEATE SCH MG: 10 TABLET ORAL at 08:48

## 2019-03-26 RX ADMIN — Medication SCH MG: at 08:49

## 2019-03-26 NOTE — NUR
PT. VITAL SIGNS TAKEN AND PT. VERY AWAKE AND SITTING IN CHAIR. REFUSED TO GO BACK TO BED. 
ENCOURAGED TO GO BACK IN BED. CALL LIGHT WITH IN REACH. GOOD AFFECT. SMILING.

## 2019-03-26 NOTE — NUR
PT. ASSISTED TO RESTROOM. STANDBY ASSIST . PT. AMBULATING WELL FROM BED TO RESTROOM SITUATED 
INSIDE ROOM. ROM X 4. A/O X 4.  WENT BACK TO SLEEP EASILY AFTER GOING RESTROOM. CALL LIGHT 
WITH IN REACH.

## 2019-03-26 NOTE — NUR
RECEIVED BEDSIDE REPORT FROM NIGHT SHIFT NURSE. PATIENT IS AWAKE, ALERT AND ORIENTEDX4. NO 
SIGNS OF DISTRESS ON RA. AMBULATE W ASSIST. FALL RISK PROTOCOL IN PLACE. PATIENT IS 
CONTINENT. IV ON R HAND AND R WRIST 22G SL, CLEAN, DRY AND INTACT. SKIN IS INTACT. VITALS 
ARE WNL B/P 147/77 HR 62 98.1 TEMP 96% RA AND RR 16. PATIENT WANTS TO GO HOME ASAP, WILL 
AWAIT DISCHARGE ORDER. BED IN LOW POSITION. CALL LIGHT WITHIN REACH. WILL CONTINUE TO 
MONITOR THE PATIENT

## 2019-03-26 NOTE — NUR
PT. AWAKE AND ALERT. BRUSHING TEETH. NO COMPLAINTS OF PAIN DONE. EAGER TO GO HOME TODAY. A/O 
X 4. ROM X 4. CLEAR SPEECH. WILL ENDORSE TO THE NEXT RN FOR CONTINUITY OF CARE.

## 2019-03-26 NOTE — NUR
ADMINISTERED MEDS. EDUCATED ON SIDE EFFECTS. PATIENT VERBALIZED UNDERSTANDING. IVS REMOVED, 
TIPS INTACT. ID BAND REMOVED. TELE REMOVED. EDUCATED ON DISEASE PROCESS, ABN S/SX, WHEN TO 
GO TO THE ER, EDUCATED ON FOLLOW UP W PCP AND NEED FOR CARDIOLOGIST REFERRAL, EDUCATED ON 
MEDS AND GAVE PRESCRIPTION. DAUGHTER SIGNED PAPERWORK AND VERBALIZED UNDERSTANDING. PATIENT 
IS CHANGING AND WILL WHEEL HIM ON A WHEELCHAIR WHEN READY.

## 2019-03-26 NOTE — NUR
VITAL SIGNS TAKEN. SLEEPING WELL. NO COMPLAINTS DONE. CALL LIGHT WITH IN REACH AND REMINDED 
TO USE CALL LIGHT FOR ANY HELP HE MAY NEED. NO SOB. NO RESTLESSNESS. TELEMETRY MONITORING.

## 2019-06-02 ENCOUNTER — HOSPITAL ENCOUNTER (EMERGENCY)
Dept: HOSPITAL 26 - MED | Age: 84
Discharge: LEFT BEFORE DISCHARGE | End: 2019-06-02
Payer: COMMERCIAL

## 2019-06-02 VITALS — WEIGHT: 160 LBS | HEIGHT: 66 IN | BODY MASS INDEX: 25.71 KG/M2

## 2019-06-02 VITALS — SYSTOLIC BLOOD PRESSURE: 194 MMHG | DIASTOLIC BLOOD PRESSURE: 101 MMHG

## 2019-06-02 VITALS — DIASTOLIC BLOOD PRESSURE: 84 MMHG | SYSTOLIC BLOOD PRESSURE: 174 MMHG

## 2019-06-02 DIAGNOSIS — I10: Primary | ICD-10-CM

## 2019-06-02 DIAGNOSIS — Z79.82: ICD-10-CM

## 2019-06-02 DIAGNOSIS — J45.909: ICD-10-CM

## 2019-06-02 DIAGNOSIS — Z79.899: ICD-10-CM

## 2019-06-02 LAB
ALBUMIN FLD-MCNC: 3.6 G/DL (ref 3.4–5)
ANION GAP SERPL CALCULATED.3IONS-SCNC: 14.2 MMOL/L (ref 8–16)
AST SERPL-CCNC: 15 U/L (ref 15–37)
BASOPHILS # BLD AUTO: 0.1 K/UL (ref 0–0.22)
BASOPHILS NFR BLD AUTO: 0.8 % (ref 0–2)
BILIRUB SERPL-MCNC: 0.4 MG/DL (ref 0–1)
BUN SERPL-MCNC: 16 MG/DL (ref 7–18)
CHLORIDE SERPL-SCNC: 104 MMOL/L (ref 98–107)
CK MB SERPL-MCNC: 1.5 NG/ML (ref 0–3.6)
CO2 SERPL-SCNC: 27.1 MMOL/L (ref 21–32)
CREAT SERPL-MCNC: 0.9 MG/DL (ref 0.7–1.3)
EOSINOPHIL # BLD AUTO: 0.2 K/UL (ref 0–0.4)
EOSINOPHIL NFR BLD AUTO: 2.4 % (ref 0–4)
ERYTHROCYTE [DISTWIDTH] IN BLOOD BY AUTOMATED COUNT: 13.1 % (ref 11.6–13.7)
GFR SERPL CREATININE-BSD FRML MDRD: (no result) ML/MIN (ref 90–?)
GLUCOSE SERPL-MCNC: 101 MG/DL (ref 74–106)
HCT VFR BLD AUTO: 41.1 % (ref 36–52)
HGB BLD-MCNC: 13.8 G/DL (ref 12–18)
LYMPHOCYTES # BLD AUTO: 2.5 K/UL (ref 2–11.5)
LYMPHOCYTES NFR BLD AUTO: 31.1 % (ref 20.5–51.1)
MCH RBC QN AUTO: 32 PG (ref 27–31)
MCHC RBC AUTO-ENTMCNC: 34 G/DL (ref 33–37)
MCV RBC AUTO: 94.7 FL (ref 80–94)
MONOCYTES # BLD AUTO: 0.4 K/UL (ref 0.8–1)
MONOCYTES NFR BLD AUTO: 5.4 % (ref 1.7–9.3)
NEUTROPHILS # BLD AUTO: 4.9 K/UL (ref 1.8–7.7)
NEUTROPHILS NFR BLD AUTO: 60.3 % (ref 42.2–75.2)
PLATELET # BLD AUTO: 141 K/UL (ref 140–450)
POTASSIUM SERPL-SCNC: 3.3 MMOL/L (ref 3.5–5.1)
RBC # BLD AUTO: 4.34 MIL/UL (ref 4.2–6.1)
SODIUM SERPL-SCNC: 142 MMOL/L (ref 136–145)
WBC # BLD AUTO: 8.1 K/UL (ref 4.8–10.8)

## 2019-06-02 PROCEDURE — 85025 COMPLETE CBC W/AUTO DIFF WBC: CPT

## 2019-06-02 PROCEDURE — 70450 CT HEAD/BRAIN W/O DYE: CPT

## 2019-06-02 PROCEDURE — 82553 CREATINE MB FRACTION: CPT

## 2019-06-02 PROCEDURE — 80053 COMPREHEN METABOLIC PANEL: CPT

## 2019-06-02 PROCEDURE — 71045 X-RAY EXAM CHEST 1 VIEW: CPT

## 2019-06-02 PROCEDURE — 82550 ASSAY OF CK (CPK): CPT

## 2019-06-02 PROCEDURE — 36415 COLL VENOUS BLD VENIPUNCTURE: CPT

## 2019-06-02 PROCEDURE — 93005 ELECTROCARDIOGRAM TRACING: CPT

## 2019-06-02 PROCEDURE — 99284 EMERGENCY DEPT VISIT MOD MDM: CPT

## 2019-06-02 PROCEDURE — 84484 ASSAY OF TROPONIN QUANT: CPT

## 2019-06-02 NOTE — NUR
Patient does not wish to proceed with medical care recommended by Dr. Warren. 
 Patient given information related to possible complications, up to and 
including death, which could occur as a result of leaving hospital at this 
time. Patient verbalizes understanding of risks involved leaving against 
medical advice. Patient has signed AMA form.

## 2019-06-02 NOTE — NUR
83/M BIB DAUGHTERS, C/O ELEVATED BP (HIGHEST ) RECENTLY. PT DENIES ANY 
PAIN, CP, SOB, N/V OR DIZZINESS. AOX4, GCS 15, PERRLA 3MM, SKIN NORMAL WARM 
DRY, SPO 96% ON RA, RR 20 EVEN AND UNLABORED. LUNG SOUNDS CLEAR BL. /84, 
HR 63 EVEN AND REGULAR, NSR ON MONITOR. BS ACTIVE X4, ABD SOFT ROUND NONTENDER

HX HTN, ASTHMA, HLD, HERNIA SURGERY

RX ENALAPRIL 20MG PO DAILY (LAST TAKEN THIS AM); BYSTOLIC 10MG PO DAILY (EXTRA 
DOSE TAKEN AT 1700)

-------------------------------------------------------------------------------

Addendum: 06/02/19 at 2150 by [email or web address]

-------------------------------------------------------------------------------

PT REPORTS MILD HEADACHE, DENIES ANY OTHER PAIN

## 2019-06-21 ENCOUNTER — HOSPITAL ENCOUNTER (EMERGENCY)
Dept: HOSPITAL 26 - MED | Age: 84
Discharge: HOME | End: 2019-06-21
Payer: COMMERCIAL

## 2019-06-21 VITALS — DIASTOLIC BLOOD PRESSURE: 79 MMHG | SYSTOLIC BLOOD PRESSURE: 180 MMHG

## 2019-06-21 VITALS — DIASTOLIC BLOOD PRESSURE: 78 MMHG | SYSTOLIC BLOOD PRESSURE: 163 MMHG

## 2019-06-21 VITALS — BODY MASS INDEX: 27.04 KG/M2 | WEIGHT: 162.31 LBS | HEIGHT: 65 IN

## 2019-06-21 DIAGNOSIS — I10: Primary | ICD-10-CM

## 2019-06-21 DIAGNOSIS — R51: ICD-10-CM

## 2019-06-21 DIAGNOSIS — J45.909: ICD-10-CM

## 2019-06-21 DIAGNOSIS — Z79.82: ICD-10-CM

## 2019-06-21 DIAGNOSIS — Z79.899: ICD-10-CM

## 2019-06-21 DIAGNOSIS — Z90.79: ICD-10-CM

## 2019-06-21 NOTE — NUR
C/O ELEVATED BP X 1 DAY. PT REPORTS AN INTERMITTENT, SLIGHT HEADACHE 2/10,  PT 
DENIES CP, SOB, N/V, SKIN IS WARM/DRY, CAP REFIL <3 SEC. PT REPORTS LAST 
URINATION THIS AM, REGULAR AMOUNT.  BED IN LOW POSTITION, DAUGHTER AT BEDSIDE.

## 2020-02-14 ENCOUNTER — HOSPITAL ENCOUNTER (EMERGENCY)
Dept: HOSPITAL 26 - MED | Age: 85
Discharge: LEFT BEFORE DISCHARGE | End: 2020-02-14
Payer: COMMERCIAL

## 2020-02-14 VITALS — BODY MASS INDEX: 25.71 KG/M2 | HEIGHT: 66 IN | WEIGHT: 160 LBS

## 2020-02-14 VITALS — SYSTOLIC BLOOD PRESSURE: 120 MMHG | DIASTOLIC BLOOD PRESSURE: 63 MMHG

## 2020-02-14 VITALS — DIASTOLIC BLOOD PRESSURE: 62 MMHG | SYSTOLIC BLOOD PRESSURE: 122 MMHG

## 2020-02-14 DIAGNOSIS — I10: ICD-10-CM

## 2020-02-14 DIAGNOSIS — R79.89: ICD-10-CM

## 2020-02-14 DIAGNOSIS — J45.909: ICD-10-CM

## 2020-02-14 DIAGNOSIS — R55: Primary | ICD-10-CM

## 2020-02-14 DIAGNOSIS — Z79.82: ICD-10-CM

## 2020-02-14 DIAGNOSIS — Z79.899: ICD-10-CM

## 2020-02-14 LAB
ALBUMIN FLD-MCNC: 3.4 G/DL (ref 3.4–5)
ANION GAP SERPL CALCULATED.3IONS-SCNC: 13 MMOL/L (ref 8–16)
AST SERPL-CCNC: 12 U/L (ref 15–37)
BASOPHILS # BLD AUTO: 0.1 K/UL (ref 0–0.22)
BASOPHILS NFR BLD AUTO: 0.7 % (ref 0–2)
BILIRUB SERPL-MCNC: 0.7 MG/DL (ref 0–1)
BUN SERPL-MCNC: 17 MG/DL (ref 7–18)
CHLORIDE SERPL-SCNC: 106 MMOL/L (ref 98–107)
CO2 SERPL-SCNC: 27.6 MMOL/L (ref 21–32)
CREAT SERPL-MCNC: 0.9 MG/DL (ref 0.6–1.3)
EOSINOPHIL # BLD AUTO: 0.1 K/UL (ref 0–0.4)
EOSINOPHIL NFR BLD AUTO: 1.1 % (ref 0–4)
ERYTHROCYTE [DISTWIDTH] IN BLOOD BY AUTOMATED COUNT: 13.2 % (ref 11.6–13.7)
GFR SERPL CREATININE-BSD FRML MDRD: (no result) ML/MIN (ref 90–?)
GLUCOSE SERPL-MCNC: 104 MG/DL (ref 74–106)
HCT VFR BLD AUTO: 40.2 % (ref 36–52)
HGB BLD-MCNC: 13.3 G/DL (ref 12–18)
LYMPHOCYTES # BLD AUTO: 1.6 K/UL (ref 2–11.5)
LYMPHOCYTES NFR BLD AUTO: 18.6 % (ref 20.5–51.1)
MCH RBC QN AUTO: 32 PG (ref 27–31)
MCHC RBC AUTO-ENTMCNC: 33 G/DL (ref 33–37)
MCV RBC AUTO: 97.6 FL (ref 80–94)
MONOCYTES # BLD AUTO: 0.5 K/UL (ref 0.8–1)
MONOCYTES NFR BLD AUTO: 5.7 % (ref 1.7–9.3)
NEUTROPHILS # BLD AUTO: 6.3 K/UL (ref 1.8–7.7)
NEUTROPHILS NFR BLD AUTO: 73.9 % (ref 42.2–75.2)
PLATELET # BLD AUTO: 131 K/UL (ref 140–450)
POTASSIUM SERPL-SCNC: 3.6 MMOL/L (ref 3.5–5.1)
PROTHROMBIN TIME: 9.9 SECS (ref 10.8–13.4)
RBC # BLD AUTO: 4.12 MIL/UL (ref 4.2–6.1)
SODIUM SERPL-SCNC: 143 MMOL/L (ref 136–145)
WBC # BLD AUTO: 8.5 K/UL (ref 4.8–10.8)

## 2020-02-14 PROCEDURE — 80053 COMPREHEN METABOLIC PANEL: CPT

## 2020-02-14 PROCEDURE — 85610 PROTHROMBIN TIME: CPT

## 2020-02-14 PROCEDURE — 82948 REAGENT STRIP/BLOOD GLUCOSE: CPT

## 2020-02-14 PROCEDURE — 36415 COLL VENOUS BLD VENIPUNCTURE: CPT

## 2020-02-14 PROCEDURE — 84484 ASSAY OF TROPONIN QUANT: CPT

## 2020-02-14 PROCEDURE — 85025 COMPLETE CBC W/AUTO DIFF WBC: CPT

## 2020-02-14 PROCEDURE — 71045 X-RAY EXAM CHEST 1 VIEW: CPT

## 2020-02-14 PROCEDURE — 93005 ELECTROCARDIOGRAM TRACING: CPT

## 2020-02-14 PROCEDURE — 85730 THROMBOPLASTIN TIME PARTIAL: CPT

## 2020-02-14 PROCEDURE — 99285 EMERGENCY DEPT VISIT HI MDM: CPT

## 2020-02-14 PROCEDURE — 83880 ASSAY OF NATRIURETIC PEPTIDE: CPT

## 2020-02-14 NOTE — NUR
Patient does not wish to proceed with medical care recommended by DR LOZANO.  
Patient given information related to possible complications, up to and 
including death, which could occur as a result of leaving hospital at this 
time.  Patient verbalizes understanding of risks involved leaving against 
medical advice.  Patient has signed AMA form.

## 2020-02-14 NOTE — NUR
83 Y/O M BIBA FOR SYNCOPE X1 TODAY WHILE SEATED EATING BREAKFAST. PER PT SON, 
PT DID NOT FALL DURING THE EPISODE, NO INJURIES NOTED. PT DENIES N/V OR PAIN. 
PT BS 95, VSS. PT PUT IN GOWN AND CONNECTED TO MONITOR, DAUGHTER AT BEDSIDE. PT 
AOX4. IV PLACED IN AMBULANCE 20 G RT AC WITH 1 L NACL RUNNING W/O DIFFICULTY. 
PT POSITOINED FOR COMFORT, SIDE RAIL X 2 IN PLACE.



NKA